# Patient Record
(demographics unavailable — no encounter records)

---

## 2025-02-24 NOTE — HISTORY OF PRESENT ILLNESS
[FreeTextEntry1] :  JOSE JUAN DAI ,55 YO, Presents for Annual  No complaints OBHX: C-sec x 4 LMP: Menopausal Sexually active. Not using any contraceptives Last pap was in 2024